# Patient Record
Sex: MALE | Race: WHITE | NOT HISPANIC OR LATINO | Employment: FULL TIME | URBAN - NONMETROPOLITAN AREA
[De-identification: names, ages, dates, MRNs, and addresses within clinical notes are randomized per-mention and may not be internally consistent; named-entity substitution may affect disease eponyms.]

---

## 2024-03-13 PROBLEM — I25.10 SINGLE VESSEL CORONARY DISEASE: Status: ACTIVE | Noted: 2024-03-13

## 2024-03-13 PROBLEM — E78.5 HYPERLIPIDEMIA: Status: ACTIVE | Noted: 2024-03-13

## 2024-03-13 RX ORDER — FLUTICASONE FUROATE AND VILANTEROL 100; 25 UG/1; UG/1
1 POWDER RESPIRATORY (INHALATION) DAILY
COMMUNITY
Start: 2022-05-09

## 2024-03-13 RX ORDER — CARVEDILOL 3.12 MG/1
1 TABLET ORAL 2 TIMES DAILY
COMMUNITY
End: 2024-04-23

## 2024-03-13 RX ORDER — ATORVASTATIN CALCIUM 40 MG/1
40 TABLET, FILM COATED ORAL
COMMUNITY
Start: 2022-03-15 | End: 2024-05-17

## 2024-03-13 RX ORDER — LISINOPRIL 5 MG/1
5 TABLET ORAL DAILY
COMMUNITY
Start: 2022-03-10 | End: 2024-05-17 | Stop reason: SDUPTHER

## 2024-03-13 RX ORDER — CETIRIZINE HYDROCHLORIDE 10 MG/1
10 TABLET ORAL
COMMUNITY

## 2024-03-13 RX ORDER — HYDROCODONE BITARTRATE AND ACETAMINOPHEN 5; 325 MG/1; MG/1
1 TABLET ORAL EVERY 6 HOURS PRN
COMMUNITY
Start: 2022-03-10

## 2024-04-21 DIAGNOSIS — I25.10 ATHEROSCLEROTIC HEART DISEASE OF NATIVE CORONARY ARTERY WITHOUT ANGINA PECTORIS: ICD-10-CM

## 2024-04-23 RX ORDER — CARVEDILOL 3.12 MG/1
3.12 TABLET ORAL 2 TIMES DAILY
Qty: 180 TABLET | Refills: 3 | Status: SHIPPED | OUTPATIENT
Start: 2024-04-23

## 2024-05-17 ENCOUNTER — OFFICE VISIT (OUTPATIENT)
Dept: CARDIOLOGY | Facility: CLINIC | Age: 59
End: 2024-05-17
Payer: COMMERCIAL

## 2024-05-17 VITALS
HEART RATE: 78 BPM | DIASTOLIC BLOOD PRESSURE: 92 MMHG | WEIGHT: 264.4 LBS | HEIGHT: 70 IN | BODY MASS INDEX: 37.85 KG/M2 | SYSTOLIC BLOOD PRESSURE: 150 MMHG

## 2024-05-17 DIAGNOSIS — I25.10 SINGLE VESSEL CORONARY DISEASE: Primary | ICD-10-CM

## 2024-05-17 DIAGNOSIS — E78.2 MIXED HYPERLIPIDEMIA: ICD-10-CM

## 2024-05-17 DIAGNOSIS — Z87.891 FORMER SMOKER: ICD-10-CM

## 2024-05-17 DIAGNOSIS — I10 ESSENTIAL HYPERTENSION: ICD-10-CM

## 2024-05-17 PROCEDURE — 1036F TOBACCO NON-USER: CPT | Performed by: INTERNAL MEDICINE

## 2024-05-17 PROCEDURE — 3080F DIAST BP >= 90 MM HG: CPT | Performed by: INTERNAL MEDICINE

## 2024-05-17 PROCEDURE — 3008F BODY MASS INDEX DOCD: CPT | Performed by: INTERNAL MEDICINE

## 2024-05-17 PROCEDURE — 3077F SYST BP >= 140 MM HG: CPT | Performed by: INTERNAL MEDICINE

## 2024-05-17 PROCEDURE — 99214 OFFICE O/P EST MOD 30 MIN: CPT | Performed by: INTERNAL MEDICINE

## 2024-05-17 RX ORDER — LISINOPRIL 10 MG/1
10 TABLET ORAL DAILY
Qty: 90 TABLET | Refills: 3 | Status: SHIPPED | OUTPATIENT
Start: 2024-05-17

## 2024-05-17 RX ORDER — ROSUVASTATIN CALCIUM 40 MG/1
40 TABLET, COATED ORAL DAILY
Qty: 30 TABLET | Refills: 11 | Status: SHIPPED | OUTPATIENT
Start: 2024-05-17 | End: 2025-05-17

## 2024-05-17 RX ORDER — ASPIRIN 81 MG/1
81 TABLET ORAL DAILY
Qty: 90 TABLET | Refills: 3 | Status: SHIPPED | OUTPATIENT
Start: 2024-05-17 | End: 2025-05-17

## 2024-05-17 NOTE — PROGRESS NOTES
"Jaguar Cheney is a 58 y.o. male       Chief Complaint    Annual Exam          HPI        Patient is here for follow-up continue management for history of coronary artery disease with remote PCI to the LAD, obesity, hyperlipidemia and hypertension.  Since last time I saw him he denies any cardiac complaint.  He denies chest pain, palpitation, lightheadedness, dizziness or syncope.  His blood pressure today seem to be suboptimally controlled on recent labs showed his lipid is suboptimally controlled with LDL around 87.    ASSESSMENT:      1. Coronary artery disease, with remote percutaneous coronary intervention to left anterior descending back in 2006., functional class I. Denies any angina  2. Obesity with continued gradual weight gain and another 7 pound weight gain weight   3. Hyperlipidemia, suboptimally controlled goal LDL around 87  4. Former smoker.  5.  Hypertension suboptimally controlled     RECOMMENDATIONS:      1.  I advised the patient to switch atorvastatin to rosuvastatin 40 mg daily and advised him to increase his lisinopril to 10 mg daily  2.  Will have a blood pressure check in 6 to 8 weeks  3. He repeat lab work as ordered in few month  4. He will notify me any change in cardiac status or symptoms.  5. Patient was counseled regarding losing weight, exercise and dietary modification  7.  I will see him back in 1 year in follow-up  Review of Systems   All other systems reviewed and are negative.           Vitals:    05/17/24 0929   BP: (!) 150/92   BP Location: Left arm   Patient Position: Sitting   Pulse: 78   Weight: 120 kg (264 lb 6.4 oz)   Height: 1.778 m (5' 10\")        Objective   Physical Exam  Constitutional:       Appearance: Normal appearance.   HENT:      Nose: Nose normal.   Neck:      Vascular: No carotid bruit.   Cardiovascular:      Rate and Rhythm: Normal rate.      Pulses: Normal pulses.      Heart sounds: Normal heart sounds.   Pulmonary:      Effort: Pulmonary " effort is normal.   Abdominal:      General: Bowel sounds are normal.      Palpations: Abdomen is soft.   Musculoskeletal:         General: Normal range of motion.      Cervical back: Normal range of motion.      Right lower leg: No edema.      Left lower leg: No edema.   Skin:     General: Skin is warm and dry.   Neurological:      General: No focal deficit present.      Mental Status: He is alert.   Psychiatric:         Mood and Affect: Mood normal.         Behavior: Behavior normal.         Thought Content: Thought content normal.         Judgment: Judgment normal.         Allergies  Bupropion     Current Medications    Current Outpatient Medications:     carvedilol (Coreg) 3.125 mg tablet, TAKE 1 TABLET TWICE A DAY, Disp: 180 tablet, Rfl: 3    cetirizine (ZyrTEC) 10 mg tablet, Take 1 tablet (10 mg) by mouth once daily., Disp: , Rfl:     fluticasone furoate-vilanteroL (Breo Ellipta) 100-25 mcg/dose inhaler, Inhale 1 puff once daily., Disp: , Rfl:     HYDROcodone-acetaminophen (Norco) 5-325 mg tablet, Take 1 tablet by mouth every 6 hours if needed., Disp: , Rfl:     aspirin 81 mg EC tablet, Take 1 tablet (81 mg) by mouth once daily., Disp: 90 tablet, Rfl: 3    lisinopril 10 mg tablet, Take 1 tablet (10 mg) by mouth once daily., Disp: 90 tablet, Rfl: 3    rosuvastatin (Crestor) 40 mg tablet, Take 1 tablet (40 mg) by mouth once daily., Disp: 30 tablet, Rfl: 11                     Assessment/Plan   1. Single vessel coronary disease  aspirin 81 mg EC tablet    Follow Up In Cardiology      2. Mixed hyperlipidemia  Aspartate Aminotransferase    Alanine Aminotransferase    Lipid Panel    rosuvastatin (Crestor) 40 mg tablet    Aspartate Aminotransferase    Alanine Aminotransferase    Lipid Panel      3. Essential hypertension  lisinopril 10 mg tablet    Follow Up In Cardiology    Basic Metabolic Panel    Basic Metabolic Panel      4. BMI 37.0-37.9, adult        5. Former smoker                 Scribe Attestation  By  signing my name below, I, King Hammond LPNibfelisa   attest that this documentation has been prepared under the direction and in the presence of Cata Silva MD.     Provider Attestation - Scribe documentation    All medical record entries made by the Scribe were at my direction and personally dictated by me. I have reviewed the chart and agree that the record accurately reflects my personal performance of the history, physical exam, discussion and plan.

## 2024-05-17 NOTE — PATIENT INSTRUCTIONS
Please bring all medicines, vitamins, and herbal supplements with you when you come to the office.    Prescriptions will not be filled unless you are compliant with your follow up appointments or have a follow up appointment scheduled as per instruction of your physician. Refills should be requested at the time of your visit.     BMI was above normal measurement. Current weight: 120 kg (264 lb 6.4 oz)  Weight change since last visit (-) denotes wt loss 7.4 lbs   Weight loss needed to achieve BMI 25: 90.5 Lbs  Weight loss needed to achieve BMI 30: 55.8 Lbs  Provided instructions on dietary changes  Provided instructions on exercise.

## 2024-07-09 ENCOUNTER — APPOINTMENT (OUTPATIENT)
Dept: CARDIOLOGY | Facility: CLINIC | Age: 59
End: 2024-07-09
Payer: COMMERCIAL

## 2024-09-11 LAB
NON-UH HIE ALANINE AMINOTRANSFERASE: 24 U/L (ref 7–52)
NON-UH HIE ANION GAP: 10.6 (ref 6–15)
NON-UH HIE ASPARTATE AMINO TRANSFERASE: 18 U/L (ref 13–39)
NON-UH HIE BLOOD UREA NITROGEN: 15 MG/DL (ref 7–25)
NON-UH HIE CALCIUM: 8.8 MG/DL (ref 8.6–10.3)
NON-UH HIE CARBON DIOXIDE: 28.4 MMOL/L (ref 21–31)
NON-UH HIE CHLORIDE: 105 MMOL/L (ref 98–107)
NON-UH HIE CHOL/HDL RATIO: 3.3
NON-UH HIE CHOLESTEROL: 149 MG/DL (ref 140–200)
NON-UH HIE CREATININE: 0.9 MG/DL (ref 0.7–1.3)
NON-UH HIE ESTIMATED GFR: > 60
NON-UH HIE GLUCOSE: 93 MG/DL (ref 70–100)
NON-UH HIE HDL CHOLESTEROL: 45 MG/DL (ref 23–92)
NON-UH HIE LDL CHOLESTEROL,CALCULATED: 84 MG/DL (ref 0–100)
NON-UH HIE POTASSIUM: 4 MMOL/L (ref 3.5–5.1)
NON-UH HIE SODIUM: 140 MMOL/L (ref 136–145)
NON-UH HIE TRIGLYCERIDE W/REFLEX: 101 MG/DL (ref 0–149)
NON-UH HIE VLDL CHOLESTEROL: 20 MG/DL

## 2024-09-13 ENCOUNTER — APPOINTMENT (OUTPATIENT)
Dept: CARDIOLOGY | Facility: CLINIC | Age: 59
End: 2024-09-13
Payer: COMMERCIAL

## 2024-09-13 VITALS
HEART RATE: 60 BPM | SYSTOLIC BLOOD PRESSURE: 126 MMHG | HEIGHT: 70 IN | DIASTOLIC BLOOD PRESSURE: 82 MMHG | BODY MASS INDEX: 36.79 KG/M2 | WEIGHT: 257 LBS

## 2024-09-13 DIAGNOSIS — I25.10 SINGLE VESSEL CORONARY DISEASE: Primary | ICD-10-CM

## 2024-09-13 DIAGNOSIS — I10 ESSENTIAL HYPERTENSION: ICD-10-CM

## 2024-09-13 DIAGNOSIS — E78.2 MIXED HYPERLIPIDEMIA: ICD-10-CM

## 2024-09-13 DIAGNOSIS — Z87.891 FORMER SMOKER: ICD-10-CM

## 2024-09-13 PROCEDURE — 3074F SYST BP LT 130 MM HG: CPT | Performed by: INTERNAL MEDICINE

## 2024-09-13 PROCEDURE — 1036F TOBACCO NON-USER: CPT | Performed by: INTERNAL MEDICINE

## 2024-09-13 PROCEDURE — 3079F DIAST BP 80-89 MM HG: CPT | Performed by: INTERNAL MEDICINE

## 2024-09-13 PROCEDURE — 99214 OFFICE O/P EST MOD 30 MIN: CPT | Performed by: INTERNAL MEDICINE

## 2024-09-13 PROCEDURE — 3008F BODY MASS INDEX DOCD: CPT | Performed by: INTERNAL MEDICINE

## 2024-09-13 NOTE — LETTER
"September 13, 2024     Martínez Mcgregor DO  [address] [facility] [address]    Patient: Abdirashid Cheney   YOB: 1965   Date of Visit: 9/13/2024       Dear Dr. Martínez Mcgregor, DO:    Thank you for referring Abdirashid Cheney to me for evaluation. Below are my notes for this consultation.  If you have questions, please do not hesitate to call me. I look forward to following your patient along with you.       Sincerely,     Cata Silva MD      CC: No Recipients  ______________________________________________________________________________________    Subjective   Abdirashid Cheney is a 58 y.o. male       Chief Complaint    Follow-up          HPI   Patient is here for follow-up continue management for coronary artery disease previous PCI to the LAD, obesity, hyperlipidemia and hypertension.  Since last time I saw him denies any cardiac complaint of chest pain, palpitation, lightheadedness, dizziness or syncope.  His recent laboratory data noted and reviewed with him.      ASSESSMENT:      1. Coronary artery disease, with remote percutaneous coronary intervention to left anterior descending back in 2006., functional class I. Denies any angina  2. Obesity with continued gradual weight gain and another 7 pound weight gain weight   3. Hyperlipidemia, suboptimally controlled goal LDL around 87  4. Former smoker.  5.  Hypertension controlled     RECOMMENDATIONS:      1.  I advised the patient to continue present medical regimen  2.  I advised him to lose weight and exercise  3.  I reviewed his lab work  4.  I will see him back in the office in 1 year in follow-up and to repeat his lab work    Review of Systems   All other systems reviewed and are negative.           Vitals:    09/13/24 0920   BP: 126/82   BP Location: Left arm   Patient Position: Sitting   Pulse: 60   Weight: 117 kg (257 lb)   Height: 1.778 m (5' 10\")        Objective   Physical Exam  Constitutional:       Appearance: " Normal appearance.   HENT:      Nose: Nose normal.   Neck:      Vascular: No carotid bruit.   Cardiovascular:      Rate and Rhythm: Normal rate.      Pulses: Normal pulses.      Heart sounds: Normal heart sounds.   Pulmonary:      Effort: Pulmonary effort is normal.   Abdominal:      General: Bowel sounds are normal.      Palpations: Abdomen is soft.   Musculoskeletal:         General: Normal range of motion.      Cervical back: Normal range of motion.      Right lower leg: No edema.      Left lower leg: No edema.   Skin:     General: Skin is warm and dry.   Neurological:      General: No focal deficit present.      Mental Status: He is alert.   Psychiatric:         Mood and Affect: Mood normal.         Behavior: Behavior normal.         Thought Content: Thought content normal.         Judgment: Judgment normal.         Allergies  Bupropion     Current Medications    Current Outpatient Medications:   •  aspirin 81 mg EC tablet, Take 1 tablet (81 mg) by mouth once daily., Disp: 90 tablet, Rfl: 3  •  carvedilol (Coreg) 3.125 mg tablet, TAKE 1 TABLET TWICE A DAY, Disp: 180 tablet, Rfl: 3  •  cetirizine (ZyrTEC) 10 mg tablet, Take 1 tablet (10 mg) by mouth once daily., Disp: , Rfl:   •  fluticasone furoate-vilanteroL (Breo Ellipta) 100-25 mcg/dose inhaler, Inhale 1 puff once daily., Disp: , Rfl:   •  HYDROcodone-acetaminophen (Norco) 5-325 mg tablet, Take 1 tablet by mouth every 6 hours if needed., Disp: , Rfl:   •  lisinopril 10 mg tablet, Take 1 tablet (10 mg) by mouth once daily., Disp: 90 tablet, Rfl: 3  •  rosuvastatin (Crestor) 40 mg tablet, Take 1 tablet (40 mg) by mouth once daily., Disp: 30 tablet, Rfl: 11                     Assessment/Plan   1. Single vessel coronary disease  Basic Metabolic Panel    Follow Up In Cardiology    Basic Metabolic Panel      2. Essential hypertension  Follow Up In Cardiology    Basic Metabolic Panel    Basic Metabolic Panel      3. Mixed hyperlipidemia  Lipid Panel    Alanine  Aminotransferase    Aspartate Aminotransferase    Lipid Panel    Alanine Aminotransferase    Aspartate Aminotransferase      4. BMI 37.0-37.9, adult        5. Former smoker                 Scribe Attestation  By signing my name below, I, Hansa MOHAN LPN   , Veronica   attest that this documentation has been prepared under the direction and in the presence of Cata Silva MD.     Provider Attestation - Scribe documentation    All medical record entries made by the Scribe were at my direction and personally dictated by me. I have reviewed the chart and agree that the record accurately reflects my personal performance of the history, physical exam, discussion and plan.

## 2024-09-13 NOTE — PATIENT INSTRUCTIONS
Please bring all medicines, vitamins, and herbal supplements with you when you come to the office.    Prescriptions will not be filled unless you are compliant with your follow up appointments or have a follow up appointment scheduled as per instruction of your physician. Refills should be requested at the time of your visit.     BMI was above normal measurement. Current weight: 117 kg (257 lb)  Weight change since last visit (-) denotes wt loss -7.4 lbs   Weight loss needed to achieve BMI 25: 83.1 Lbs  Weight loss needed to achieve BMI 30: 48.4 Lbs  Provided instructions on dietary changes  Provided instructions on exercise.    One year with lab

## 2024-09-13 NOTE — PROGRESS NOTES
"Subjective   Abdirashid Cheney is a 58 y.o. male       Chief Complaint    Follow-up          HPI   Patient is here for follow-up continue management for coronary artery disease previous PCI to the LAD, obesity, hyperlipidemia and hypertension.  Since last time I saw him denies any cardiac complaint of chest pain, palpitation, lightheadedness, dizziness or syncope.  His recent laboratory data noted and reviewed with him.      ASSESSMENT:      1. Coronary artery disease, with remote percutaneous coronary intervention to left anterior descending back in 2006., functional class I. Denies any angina  2. Obesity with continued gradual weight gain and another 7 pound weight gain weight   3. Hyperlipidemia, suboptimally controlled goal LDL around 87  4. Former smoker.  5.  Hypertension controlled     RECOMMENDATIONS:      1.  I advised the patient to continue present medical regimen  2.  I advised him to lose weight and exercise  3.  I reviewed his lab work  4.  I will see him back in the office in 1 year in follow-up and to repeat his lab work    Review of Systems   All other systems reviewed and are negative.           Vitals:    09/13/24 0920   BP: 126/82   BP Location: Left arm   Patient Position: Sitting   Pulse: 60   Weight: 117 kg (257 lb)   Height: 1.778 m (5' 10\")        Objective   Physical Exam  Constitutional:       Appearance: Normal appearance.   HENT:      Nose: Nose normal.   Neck:      Vascular: No carotid bruit.   Cardiovascular:      Rate and Rhythm: Normal rate.      Pulses: Normal pulses.      Heart sounds: Normal heart sounds.   Pulmonary:      Effort: Pulmonary effort is normal.   Abdominal:      General: Bowel sounds are normal.      Palpations: Abdomen is soft.   Musculoskeletal:         General: Normal range of motion.      Cervical back: Normal range of motion.      Right lower leg: No edema.      Left lower leg: No edema.   Skin:     General: Skin is warm and dry.   Neurological:      " General: No focal deficit present.      Mental Status: He is alert.   Psychiatric:         Mood and Affect: Mood normal.         Behavior: Behavior normal.         Thought Content: Thought content normal.         Judgment: Judgment normal.         Allergies  Bupropion     Current Medications    Current Outpatient Medications:     aspirin 81 mg EC tablet, Take 1 tablet (81 mg) by mouth once daily., Disp: 90 tablet, Rfl: 3    carvedilol (Coreg) 3.125 mg tablet, TAKE 1 TABLET TWICE A DAY, Disp: 180 tablet, Rfl: 3    cetirizine (ZyrTEC) 10 mg tablet, Take 1 tablet (10 mg) by mouth once daily., Disp: , Rfl:     fluticasone furoate-vilanteroL (Breo Ellipta) 100-25 mcg/dose inhaler, Inhale 1 puff once daily., Disp: , Rfl:     HYDROcodone-acetaminophen (Norco) 5-325 mg tablet, Take 1 tablet by mouth every 6 hours if needed., Disp: , Rfl:     lisinopril 10 mg tablet, Take 1 tablet (10 mg) by mouth once daily., Disp: 90 tablet, Rfl: 3    rosuvastatin (Crestor) 40 mg tablet, Take 1 tablet (40 mg) by mouth once daily., Disp: 30 tablet, Rfl: 11                     Assessment/Plan   1. Single vessel coronary disease  Basic Metabolic Panel    Follow Up In Cardiology    Basic Metabolic Panel      2. Essential hypertension  Follow Up In Cardiology    Basic Metabolic Panel    Basic Metabolic Panel      3. Mixed hyperlipidemia  Lipid Panel    Alanine Aminotransferase    Aspartate Aminotransferase    Lipid Panel    Alanine Aminotransferase    Aspartate Aminotransferase      4. BMI 37.0-37.9, adult        5. Former smoker                 Scribe Attestation  By signing my name below, Hansa SU LPN, Scribe   attest that this documentation has been prepared under the direction and in the presence of Cata Silva MD.     Provider Attestation - Scribe documentation    All medical record entries made by the Scribe were at my direction and personally dictated by me. I have reviewed the chart and agree that the record accurately  reflects my personal performance of the history, physical exam, discussion and plan.

## 2024-11-27 DIAGNOSIS — E78.2 MIXED HYPERLIPIDEMIA: ICD-10-CM

## 2024-11-29 RX ORDER — ROSUVASTATIN CALCIUM 40 MG/1
40 TABLET, COATED ORAL DAILY
Qty: 90 TABLET | Refills: 3 | Status: SHIPPED | OUTPATIENT
Start: 2024-11-29

## 2025-01-05 DIAGNOSIS — I10 ESSENTIAL HYPERTENSION: ICD-10-CM

## 2025-01-06 RX ORDER — LISINOPRIL 10 MG/1
10 TABLET ORAL DAILY
Qty: 90 TABLET | Refills: 3 | Status: SHIPPED | OUTPATIENT
Start: 2025-01-06 | End: 2026-01-06

## 2025-04-10 DIAGNOSIS — I25.10 ATHEROSCLEROTIC HEART DISEASE OF NATIVE CORONARY ARTERY WITHOUT ANGINA PECTORIS: ICD-10-CM

## 2025-04-10 RX ORDER — CARVEDILOL 3.12 MG/1
3.12 TABLET ORAL 2 TIMES DAILY
Qty: 180 TABLET | Refills: 3 | Status: SHIPPED | OUTPATIENT
Start: 2025-04-10 | End: 2026-04-10

## 2025-05-20 ENCOUNTER — APPOINTMENT (OUTPATIENT)
Dept: CARDIOLOGY | Facility: CLINIC | Age: 60
End: 2025-05-20
Payer: COMMERCIAL

## 2025-09-04 ENCOUNTER — TELEPHONE (OUTPATIENT)
Dept: CARDIOLOGY | Facility: CLINIC | Age: 60
End: 2025-09-04
Payer: COMMERCIAL

## 2025-09-04 DIAGNOSIS — E78.2 MIXED HYPERLIPIDEMIA: ICD-10-CM

## 2025-09-04 DIAGNOSIS — I25.10 SINGLE VESSEL CORONARY DISEASE: ICD-10-CM

## 2025-09-08 ENCOUNTER — APPOINTMENT (OUTPATIENT)
Dept: CARDIOLOGY | Facility: CLINIC | Age: 60
End: 2025-09-08
Payer: COMMERCIAL